# Patient Record
(demographics unavailable — no encounter records)

---

## 2024-11-19 NOTE — HEALTH RISK ASSESSMENT
[Good] : ~his/her~  mood as  good [Yes] : Yes [2 - 4 times a month (2 pts)] : 2-4 times a month (2 points) [1 or 2 (0 pts)] : 1 or 2 (0 points) [Never (0 pts)] : Never (0 points) [No] : In the past 12 months have you used drugs other than those required for medical reasons? No [0] : 2) Feeling down, depressed, or hopeless: Not at all (0) [PHQ-2 Negative - No further assessment needed] : PHQ-2 Negative - No further assessment needed [[id number]] : 0 [Never] : Never [Change in mental status noted] : No change in mental status noted

## 2024-11-19 NOTE — ASSESSMENT
[FreeTextEntry1] : HLD: Discussed red rice yeast as he does not want to start medication. EKG shows sinus rhythm. Check lipids.  HCM: Check labs. Will discuss results.